# Patient Record
Sex: FEMALE | URBAN - METROPOLITAN AREA
[De-identification: names, ages, dates, MRNs, and addresses within clinical notes are randomized per-mention and may not be internally consistent; named-entity substitution may affect disease eponyms.]

---

## 2019-12-30 ENCOUNTER — NURSE TRIAGE (OUTPATIENT)
Dept: NURSING | Facility: CLINIC | Age: 29
End: 2019-12-30

## 2019-12-30 NOTE — TELEPHONE ENCOUNTER
Asha is calling to see if [facility] does pregnancy testing.  [facility] phoned [facility] and they confirmed yes they do pregnancy testing.